# Patient Record
Sex: MALE | Race: WHITE | Employment: UNEMPLOYED | URBAN - METROPOLITAN AREA
[De-identification: names, ages, dates, MRNs, and addresses within clinical notes are randomized per-mention and may not be internally consistent; named-entity substitution may affect disease eponyms.]

---

## 2021-04-11 ENCOUNTER — HOSPITAL ENCOUNTER (INPATIENT)
Age: 21
LOS: 3 days | Discharge: HOME | DRG: 885 | End: 2021-04-14
Attending: EMERGENCY MEDICINE | Admitting: PSYCHIATRY & NEUROLOGY
Payer: COMMERCIAL

## 2021-04-11 DIAGNOSIS — R45.851 SUICIDAL IDEATION: Primary | ICD-10-CM

## 2021-04-11 PROBLEM — F33.2 MDD (MAJOR DEPRESSIVE DISORDER), RECURRENT SEVERE, WITHOUT PSYCHOSIS (HCC): Status: ACTIVE | Noted: 2021-04-11

## 2021-04-11 PROBLEM — F32.9 MAJOR DEPRESSIVE DISORDER, SINGLE EPISODE, UNSPECIFIED: Status: ACTIVE | Noted: 2021-04-11

## 2021-04-11 PROBLEM — R03.0 ELEVATED BLOOD PRESSURE READING IN OFFICE WITHOUT DIAGNOSIS OF HYPERTENSION: Status: ACTIVE | Noted: 2021-04-11

## 2021-04-11 PROBLEM — F32.2 CURRENT SEVERE EPISODE OF MAJOR DEPRESSIVE DISORDER WITHOUT PSYCHOTIC FEATURES WITHOUT PRIOR EPISODE (HCC): Status: ACTIVE | Noted: 2021-04-11

## 2021-04-11 LAB
ABSOLUTE EOS #: 0.1 K/UL (ref 0–0.4)
ABSOLUTE IMMATURE GRANULOCYTE: ABNORMAL K/UL (ref 0–0.3)
ABSOLUTE LYMPH #: 2.7 K/UL (ref 1.2–5.2)
ABSOLUTE MONO #: 0.7 K/UL (ref 0.1–1.3)
ACETAMINOPHEN LEVEL: <5 UG/ML (ref 10–30)
ALBUMIN SERPL-MCNC: 4.6 G/DL (ref 3.5–5.2)
ALBUMIN/GLOBULIN RATIO: ABNORMAL (ref 1–2.5)
ALP BLD-CCNC: 102 U/L (ref 40–129)
ALT SERPL-CCNC: 42 U/L (ref 5–41)
ANION GAP SERPL CALCULATED.3IONS-SCNC: 14 MMOL/L (ref 9–17)
AST SERPL-CCNC: 31 U/L
BASOPHILS # BLD: 1 % (ref 0–2)
BASOPHILS ABSOLUTE: 0.1 K/UL (ref 0–0.2)
BILIRUB SERPL-MCNC: 0.6 MG/DL (ref 0.3–1.2)
BUN BLDV-MCNC: 15 MG/DL (ref 6–20)
BUN/CREAT BLD: ABNORMAL (ref 9–20)
CALCIUM SERPL-MCNC: 9.5 MG/DL (ref 8.6–10.4)
CHLORIDE BLD-SCNC: 103 MMOL/L (ref 98–107)
CO2: 23 MMOL/L (ref 20–31)
CREAT SERPL-MCNC: 0.77 MG/DL (ref 0.7–1.2)
DIFFERENTIAL TYPE: ABNORMAL
EOSINOPHILS RELATIVE PERCENT: 1 % (ref 0–4)
ETHANOL PERCENT: <0.01 %
ETHANOL: <10 MG/DL
GFR AFRICAN AMERICAN: ABNORMAL ML/MIN
GFR NON-AFRICAN AMERICAN: ABNORMAL ML/MIN
GFR SERPL CREATININE-BSD FRML MDRD: ABNORMAL ML/MIN/{1.73_M2}
GFR SERPL CREATININE-BSD FRML MDRD: ABNORMAL ML/MIN/{1.73_M2}
GLUCOSE BLD-MCNC: 117 MG/DL (ref 70–99)
HCT VFR BLD CALC: 43.1 % (ref 41–53)
HEMOGLOBIN: 15 G/DL (ref 13.5–17.5)
IMMATURE GRANULOCYTES: ABNORMAL %
LYMPHOCYTES # BLD: 22 % (ref 25–45)
MCH RBC QN AUTO: 30.3 PG (ref 26–34)
MCHC RBC AUTO-ENTMCNC: 34.7 G/DL (ref 31–37)
MCV RBC AUTO: 87.3 FL (ref 80–100)
MONOCYTES # BLD: 6 % (ref 2–8)
NRBC AUTOMATED: ABNORMAL PER 100 WBC
PDW BLD-RTO: 12.6 % (ref 11.5–14.9)
PLATELET # BLD: 328 K/UL (ref 150–450)
PLATELET ESTIMATE: ABNORMAL
PMV BLD AUTO: 9.2 FL (ref 6–12)
POTASSIUM SERPL-SCNC: 4.3 MMOL/L (ref 3.7–5.3)
RBC # BLD: 4.94 M/UL (ref 4.5–5.9)
RBC # BLD: ABNORMAL 10*6/UL
SALICYLATE LEVEL: <1 MG/DL (ref 3–10)
SARS-COV-2, RAPID: NOT DETECTED
SEG NEUTROPHILS: 70 % (ref 34–64)
SEGMENTED NEUTROPHILS ABSOLUTE COUNT: 8.7 K/UL (ref 1.3–9.1)
SODIUM BLD-SCNC: 140 MMOL/L (ref 135–144)
SPECIMEN DESCRIPTION: NORMAL
TOTAL PROTEIN: 8.9 G/DL (ref 6.4–8.3)
WBC # BLD: 12.3 K/UL (ref 4.5–13.5)
WBC # BLD: ABNORMAL 10*3/UL

## 2021-04-11 PROCEDURE — 6370000000 HC RX 637 (ALT 250 FOR IP): Performed by: PSYCHIATRY & NEUROLOGY

## 2021-04-11 PROCEDURE — 87635 SARS-COV-2 COVID-19 AMP PRB: CPT

## 2021-04-11 PROCEDURE — 99283 EMERGENCY DEPT VISIT LOW MDM: CPT

## 2021-04-11 PROCEDURE — 36415 COLL VENOUS BLD VENIPUNCTURE: CPT

## 2021-04-11 PROCEDURE — 80053 COMPREHEN METABOLIC PANEL: CPT

## 2021-04-11 PROCEDURE — 80179 DRUG ASSAY SALICYLATE: CPT

## 2021-04-11 PROCEDURE — 99222 1ST HOSP IP/OBS MODERATE 55: CPT | Performed by: INTERNAL MEDICINE

## 2021-04-11 PROCEDURE — 80143 DRUG ASSAY ACETAMINOPHEN: CPT

## 2021-04-11 PROCEDURE — 85025 COMPLETE CBC W/AUTO DIFF WBC: CPT

## 2021-04-11 PROCEDURE — 1240000000 HC EMOTIONAL WELLNESS R&B

## 2021-04-11 PROCEDURE — 80307 DRUG TEST PRSMV CHEM ANLYZR: CPT

## 2021-04-11 PROCEDURE — 90792 PSYCH DIAG EVAL W/MED SRVCS: CPT | Performed by: PSYCHIATRY & NEUROLOGY

## 2021-04-11 PROCEDURE — G0480 DRUG TEST DEF 1-7 CLASSES: HCPCS

## 2021-04-11 RX ORDER — PAROXETINE HYDROCHLORIDE 25 MG/1
25 TABLET, FILM COATED, EXTENDED RELEASE ORAL EVERY MORNING
Status: ON HOLD | COMMUNITY
End: 2021-04-14 | Stop reason: HOSPADM

## 2021-04-11 RX ORDER — POLYETHYLENE GLYCOL 3350 17 G/17G
17 POWDER, FOR SOLUTION ORAL DAILY PRN
Status: DISCONTINUED | OUTPATIENT
Start: 2021-04-11 | End: 2021-04-14 | Stop reason: HOSPADM

## 2021-04-11 RX ORDER — LORAZEPAM 1 MG/1
1 TABLET ORAL NIGHTLY
Status: DISCONTINUED | OUTPATIENT
Start: 2021-04-11 | End: 2021-04-11

## 2021-04-11 RX ORDER — CLORAZEPATE DIPOTASSIUM 7.5 MG/1
7.5 TABLET ORAL NIGHTLY
COMMUNITY

## 2021-04-11 RX ORDER — ACETAMINOPHEN 325 MG/1
650 TABLET ORAL EVERY 4 HOURS PRN
Status: DISCONTINUED | OUTPATIENT
Start: 2021-04-11 | End: 2021-04-14 | Stop reason: HOSPADM

## 2021-04-11 RX ORDER — MAGNESIUM HYDROXIDE/ALUMINUM HYDROXICE/SIMETHICONE 120; 1200; 1200 MG/30ML; MG/30ML; MG/30ML
30 SUSPENSION ORAL EVERY 6 HOURS PRN
Status: DISCONTINUED | OUTPATIENT
Start: 2021-04-11 | End: 2021-04-14 | Stop reason: HOSPADM

## 2021-04-11 RX ORDER — TRAZODONE HYDROCHLORIDE 50 MG/1
50 TABLET ORAL NIGHTLY PRN
Status: DISCONTINUED | OUTPATIENT
Start: 2021-04-11 | End: 2021-04-14 | Stop reason: HOSPADM

## 2021-04-11 RX ORDER — HYDROXYZINE 50 MG/1
50 TABLET, FILM COATED ORAL 3 TIMES DAILY PRN
Status: DISCONTINUED | OUTPATIENT
Start: 2021-04-11 | End: 2021-04-14 | Stop reason: HOSPADM

## 2021-04-11 RX ORDER — PAROXETINE HYDROCHLORIDE 12.5 MG/1
25 TABLET, FILM COATED, EXTENDED RELEASE ORAL EVERY MORNING
Status: DISCONTINUED | OUTPATIENT
Start: 2021-04-12 | End: 2021-04-11

## 2021-04-11 RX ORDER — PAROXETINE HYDROCHLORIDE 12.5 MG/1
25 TABLET, FILM COATED, EXTENDED RELEASE ORAL EVERY MORNING
Status: DISCONTINUED | OUTPATIENT
Start: 2021-04-11 | End: 2021-04-14 | Stop reason: HOSPADM

## 2021-04-11 RX ORDER — CLONAZEPAM 0.5 MG/1
0.5 TABLET ORAL NIGHTLY
Status: DISCONTINUED | OUTPATIENT
Start: 2021-04-11 | End: 2021-04-14 | Stop reason: HOSPADM

## 2021-04-11 RX ORDER — IBUPROFEN 400 MG/1
400 TABLET ORAL EVERY 6 HOURS PRN
Status: DISCONTINUED | OUTPATIENT
Start: 2021-04-11 | End: 2021-04-14 | Stop reason: HOSPADM

## 2021-04-11 RX ORDER — CLORAZEPATE DIPOTASSIUM 7.5 MG/1
7.5 TABLET ORAL NIGHTLY
Status: DISCONTINUED | OUTPATIENT
Start: 2021-04-11 | End: 2021-04-11 | Stop reason: CLARIF

## 2021-04-11 RX ADMIN — HYDROXYZINE HYDROCHLORIDE 50 MG: 50 TABLET, FILM COATED ORAL at 18:56

## 2021-04-11 RX ADMIN — CLONAZEPAM 0.5 MG: 0.5 TABLET ORAL at 20:31

## 2021-04-11 RX ADMIN — PAROXETINE HYDROCHLORIDE 25 MG: 12.5 TABLET, FILM COATED, EXTENDED RELEASE ORAL at 15:33

## 2021-04-11 RX ADMIN — TRAZODONE HYDROCHLORIDE 50 MG: 50 TABLET ORAL at 20:31

## 2021-04-11 SDOH — HEALTH STABILITY: MENTAL HEALTH: HOW OFTEN DO YOU HAVE A DRINK CONTAINING ALCOHOL?: NEVER

## 2021-04-11 ASSESSMENT — SLEEP AND FATIGUE QUESTIONNAIRES
DO YOU HAVE DIFFICULTY SLEEPING: YES
RESTFUL SLEEP: NO
DO YOU USE A SLEEP AID: NO
DIFFICULTY STAYING ASLEEP: NO
AVERAGE NUMBER OF SLEEP HOURS: 8

## 2021-04-11 ASSESSMENT — PATIENT HEALTH QUESTIONNAIRE - PHQ9: SUM OF ALL RESPONSES TO PHQ QUESTIONS 1-9: 3

## 2021-04-11 ASSESSMENT — ENCOUNTER SYMPTOMS
SHORTNESS OF BREATH: 0
VOMITING: 0
ABDOMINAL PAIN: 0
BACK PAIN: 0
COUGH: 0
DIARRHEA: 0

## 2021-04-11 ASSESSMENT — LIFESTYLE VARIABLES
HISTORY_ALCOHOL_USE: NO
HISTORY_ALCOHOL_USE: NO

## 2021-04-11 NOTE — H&P
Department of Psychiatry  Attending Physician Psychiatric Assessment     Reason for Admission to Psychiatric Unit:  Concerns about patient's safety in the community    CHIEF COMPLAINT:  Suicidal ideation to drive car off bridge    History obtained from: Patient, electronic medical record     PER ED REPORT:  \"This is a 29-year-old male with a history of depression who comes in today with suicidal ideation the patient was brought here by his friends the patient has a plan to take his car and drive it off of the bridge. The patient states that he is taking medications for his depression he follows up with a psychiatrist his grandparents  earlier this year and those were the family members that he was closest with he has been having some arguments with his parents and he has been living at different friends houses because he has an unstable living situation. He says sometimes he drinks alcohol but he did not drink alcohol today. He denies any drug use. He denies any medical complaints at this time\". HISTORY OF PRESENT ILLNESS:    Orestes Vasquez is a 24 y.o. male with significant past medical history of elevated blood pressure reading in office without diagnosis of hypertension who was brought to the ED by friends who became concerned for patient because he wasn't acting like himself. Patient presented with suicidal ideation to take his car and drive it off the bridge. Patient started becoming depressed due to his grandfather's death in March and his grandmother's death in the fall. Patient identifies them as his primary support system. Patient states that his father just kicked him out \"he doesn't get the mental health stuff\" so he is staying with friends. Patient reports feeling down and low for more days than not. Endorses anhedonia. Patient notes worsening depression has been worsening for last approximately 3 to 4 weeks . Patient identifies stressors as the loss of his grandparents.  Patient reports drinking 3 to 4 drinks almost daily, especially around his dad. Patient reports he drinks seltzers. Denies the use of any other drugs or substances. LEGAL HISTORY:   HISTORY OF INCARCERATION: no    Family History:   History reviewed. No pertinent family history. Psychiatric Family History  Reports he believes his aunt has depression. Denies suicides in family  Endorses substance use in family; alcohol use by father and reports alcohol use \"by most of the family\"    Nutrition/Sleep/Physical Activity  Appetite: Diminished appetite since August. Patient reports a weight loss of 10-15 pounds since August.     Sleep: Patient reports he either sleeps \"a lot\" or gets up in the middle of the night. Patient reports his sleep is non-restorative. Patient endorses nightmares frequently regarding past trauma. Physical Activity: Walking    PHYSICAL EXAM:  Vitals:  BP (!) 126/106   Pulse 88   Temp 98.8 °F (37.1 °C) (Oral)   Resp 16   Ht 5' 10\" (1.778 m)   Wt 245 lb (111.1 kg)   SpO2 98%   BMI 35.15 kg/m²     LABS:  Labs reviewed. Review of Systems   Constitutional: Negative for chills and weight loss. HENT: Negative for ear pain and nosebleeds. Eyes: Negative for blurred vision and photophobia. Respiratory: Negative for cough, shortness of breath and wheezing. Cardiovascular: Negative for chest pain and palpitations. Gastrointestinal: Negative for abdominal pain, diarrhea and vomiting. Genitourinary: Negative for dysuria and urgency. Musculoskeletal: Negative for falls and joint pain. Patient reports his left ankle pain is resolved. Skin: Negative for itching and rash. Neurological: Negative for tremors, seizures and weakness. Endo/Heme/Allergies: Does not bruise/bleed easily. Physical Exam:   Constitutional:  Appears well-developed and well-nourished, no acute distress. HENT:   Head: Normocephalic and atraumatic. Eyes: Conjunctivae are normal. Right eye exhibits no discharge. after symptoms stabilized. Risk Management:  close watch per standard protocol      Psychotherapy:  participation in milieu and group and individual sessions with Attending Physician,  and Physician Assistant/CNP      Estimated length of stay:  2-14 days      GENERAL PATIENT/FAMILY EDUCATION  Patient will understand basic signs and symptoms, patient will understand benefits/risks and potential side effects from proposed medications, and patient will understand their role in recovery. Family is not active in patient's care. Patient assets that may be helpful during treatment include: Intent to participate and engage in treatment, sufficient fund of knowledge and intellect to understand and utilize treatments. Goals:    1) Remission of suicidal ideation  2) Stabilization of symptoms prior to discharge  3) Establish efficacy and tolerability of medications. Behavioral Services  Medicare Certification     Admission Day 1  I certify that this patient's inpatient psychiatric hospital admission is medically necessary for:    x (1) treatment which could reasonably be expected to improve this patient's condition, or    x (2) diagnostic study or its equivalent. Time Spent: 60 minutes     Physicians Signature:  Electronically signed by KAMRYN Cervantes CNP on 4/11/21 at 2:31 PM EDT                                         Psychiatry Attending Attestation     I independently saw and evaluated the patient. I reviewed the nurse practitioner's documentation above. Any additional comments or changes to the nurse practitioners documentation are stated below otherwise agree with assessment. Patient is a 27-year-old single  male with history of depression admitted for worsening suicidal thoughts and a plan to consult. Patient reports he has been feeling down sad and low since February of this year.   Reports his paternal grandparents who were very supportive passed away in February following which she has been dealing with severe depression. Reports he got into a conflict with his father following which his father kicked him out of the house. Reports he has been living with his friends for last few weeks which has been very stressful for him. Mentions that he has a job at this point and his friends are very supportive. Endorses constant feelings of helplessness hopelessness and worthlessness. Reports poor energy and concentration. Reports having trouble falling asleep and staying asleep. Reports poor appetite. Agree with rest of the assessment and plan as above.      Electronically signed by Siva Whalen MD on 4/11/21 at 6:24 PM EDT

## 2021-04-11 NOTE — GROUP NOTE
Group Therapy Note    Date: 4/11/2021    Group Start Time: 0900  Group End Time: 0915  Group Topic: Community Meeting    TORREY Trevizo        Group Therapy Note    Attendees: 11/21         Patient's Goal:  To increase interpersonal interaction     Notes:      Status After Intervention:  Improved    Participation Level:  Active Listener and Interactive    Participation Quality: Appropriate, Attentive and Sharing      Speech:  normal      Thought Process/Content: Logical  Linear      Affective Functioning: Congruent      Mood: euthymic      Level of consciousness:  Alert, Oriented x4 and Attentive      Response to Learning: Progressing to goal      Endings: None Reported    Modes of Intervention: Education, Support, Socialization, Exploration, Clarifying and Problem-solving      Discipline Responsible: Psychoeducational Specialist      Signature:  Liliana Trevizo

## 2021-04-11 NOTE — CONSULTS
[facility] Internal Medicine    CONSULTATION    / FOLLOW UP VISIT       Date:   4/11/2021  Patient name:  Vida Maier  Date of admission:  4/11/2021  2:52 AM  MRN:   [id number]  Account:  [de-identified]  YOB: 2000  PCP:    No primary care provider on file. Room:   [address] [place] [address]  Code Status:    Full Code    Physician Requesting Consult: Jalen Page MD    History of Present Illness:      C/C ;  Medical comorbidity management     REASON FOR CONSULT;  Medical comorbidity and medication management ;                                                 *Active Problems:    Major depressive disorder, single episode, unspecified    Elevated blood pressure reading in office without diagnosis of hypertension  Resolved Problems:    * No resolved hospital problems. *           HPI;        Consulted for high BP . Was 164 on admission , 126 now . Anxious , depressed . Will monitor . Past and Surgical hx as in H and P  Social History:     Tobacco:    reports that he has never smoked. He has never used smokeless tobacco.  Alcohol:      reports no history of alcohol use. Drug Use:  reports no history of drug use. Review of Systems:     POSITIVE AND NEGATIVES AS DESCRIBED IN HISTORY OF PRESENT ILLNESS ;  IN ADDITION ;  Review of Systems          All other systems negative                Physical Exam:     Physical Exam   Vitals:    04/11/21 0330 04/11/21 0556 04/11/21 0830   BP: (!) 151/74 (!) 147/79 (!) 126/106   Pulse: 100 100 88   Resp: 18 16 16   Temp: 99.2 °F (37.3 °C) 99.2 °F (37.3 °C) 98.8 °F (37.1 °C)   TempSrc: Oral Oral Oral   SpO2: 98% 98%    Weight: 245 lb (111.1 kg) 245 lb (111.1 kg)    Height: 5' 10\" (1.778 m) 5' 10\" (1.778 m)                    Body mass index is 35.15 kg/m². General Appearance:   -, CO-OPERATIVE ,                                                        Pulmonary/Chest:        Clear to auscultation bilaterally . No wheezes, rales or rhonchi . Cardiovascular:            Normal rate, regular rhythm,                                          No murmur or  Gallop . Abdomen:                       Soft, non-tender                                                                                    Extremities:                    No Edema . Neuromuskuloskeletal    ... Data:     URINE ANALYSIS: No results found for: LABURIN     CBC:  Lab Results   Component Value Date    WBC 12.3 04/11/2021    HGB 15.0 04/11/2021     04/11/2021        BMP:    Lab Results   Component Value Date     04/11/2021    K 4.3 04/11/2021     04/11/2021    CO2 23 04/11/2021    BUN 15 04/11/2021    CREATININE 0.77 04/11/2021    GLUCOSE 117 04/11/2021      LIVER PROFILE:  Lab Results   Component Value Date    ALT 42 04/11/2021    AST 31 04/11/2021    PROT 8.9 04/11/2021    BILITOT 0.60 04/11/2021    LABALBU 4.6 04/11/2021             Radiology:         Medications: Allergies:  No Known Allergies    Current Meds:   Scheduled Meds:   Continuous Infusions:   PRN Meds: acetaminophen, aluminum & magnesium hydroxide-simethicone, hydrOXYzine, ibuprofen, polyethylene glycol, traZODone        Assessment :       Assessment Dx  Active Problems:    Major depressive disorder, single episode, unspecified    Elevated blood pressure reading in office without diagnosis of hypertension  Resolved Problems:    * No resolved hospital problems. *              Plan: Will monitor . Most likely strss induced . Labs ok      Thanks for consulting us . Will monitor vitals and clinical course , and  Optimize therapy  as needed . Dorcas Calvert MD    Copy sent to Dr. Janie Patrick primary care provider on file. Pleasenote that this chart was generated using voice recognition Dragon dictation software.   Although every effort was made to ensure the accuracy of this automated transcription, some errors in transcription may have occurred.

## 2021-04-11 NOTE — ED PROVIDER NOTES
EMERGENCY DEPARTMENT ENCOUNTER    Pt Name: Clotilde Doran  MRN: [id number]  [place] 2000  Date of evaluation: 21  CHIEF COMPLAINT       Chief Complaint   Patient presents with    Mental Health Problem     HISTORY OF PRESENT ILLNESS   HPI     This is a 64-year-old male with a history of depression who comes in today with suicidal ideation the patient was brought here by his friends the patient has a plan to take his car and drive it off of the bridge. The patient states that he is taking medications for his depression he follows up with a psychiatrist his grandparents  earlier this year and those were the family members that he was closest with he has been having some arguments with his parents and he has been living at different friends houses because he has an unstable living situation. He says sometimes he drinks alcohol but he did not drink alcohol today. He denies any drug use. He denies any medical complaints at this time    REVIEW OF SYSTEMS     Review of Systems   Constitutional: Negative for fever. HENT: Negative for congestion. Respiratory: Negative for cough and shortness of breath. Cardiovascular: Negative for chest pain. Gastrointestinal: Negative for abdominal pain, diarrhea and vomiting. Genitourinary: Negative for dysuria. Musculoskeletal: Negative for back pain. Skin: Negative for rash. Neurological: Negative for headaches. Psychiatric/Behavioral: Positive for suicidal ideas. All other systems reviewed and are negative. PASTMEDICAL HISTORY   History reviewed. No pertinent past medical history. SURGICAL HISTORY     History reviewed. No pertinent surgical history. CURRENT MEDICATIONS       Previous Medications    No medications on file     ALLERGIES     has No Known Allergies. FAMILY HISTORY     has no family status information on file.       SOCIAL HISTORY       Social History     Tobacco Use    Smoking status: Never Smoker    Smokeless tobacco: Never Used   Substance Use Topics    Alcohol use: Never     Frequency: Never    Drug use: Never     PHYSICAL EXAM     INITIAL VITALS: BP (!) 151/74   Pulse 100   Temp 99.2 °F (37.3 °C) (Oral)   Resp 18   Ht 5' 10\" (1.778 m)   Wt 245 lb (111.1 kg)   SpO2 98%   BMI 35.15 kg/m²    Physical Exam  Vitals signs and nursing note reviewed. Constitutional:       General: He is not in acute distress. Appearance: He is well-developed. He is obese. HENT:      Head: Normocephalic and atraumatic. Eyes:      Conjunctiva/sclera: Conjunctivae normal.   Neck:      Musculoskeletal: Neck supple. Cardiovascular:      Rate and Rhythm: Normal rate and regular rhythm. Heart sounds: No murmur. No friction rub. Pulmonary:      Effort: Pulmonary effort is normal. No respiratory distress. Breath sounds: Normal breath sounds. No stridor. No wheezing or rhonchi. Abdominal:      General: There is no distension. Palpations: Abdomen is soft. Tenderness: There is no abdominal tenderness. There is no guarding or rebound. Skin:     General: Skin is warm and dry. Capillary Refill: Capillary refill takes less than 2 seconds. Neurological:      Mental Status: He is alert. Psychiatric:      Comments: Suicidal ideation       DIAGNOSTIC RESULTS   RADIOLOGY:All plain film, CT, MRI, and formal ultrasound images (except ED bedside ultrasound) are read by the radiologist, see reports below, unless otherwisenoted in MDM or here. No orders to display     LABS: All lab results were reviewed by myself, and all abnormals are listed below.   Labs Reviewed   CBC WITH AUTO DIFFERENTIAL - Abnormal; Notable for the following components:       Result Value    Seg Neutrophils 70 (*)     Lymphocytes 22 (*)     All other components within normal limits   COMPREHENSIVE METABOLIC PANEL - Abnormal; Notable for the following components:    Glucose 117 (*)     ALT 42 (*)     Total Protein 8.9 (*)     All other components within normal limits   ACETAMINOPHEN LEVEL - Abnormal; Notable for the following components:    Acetaminophen Level <5 (*)     All other components within normal limits   SALICYLATE LEVEL - Abnormal; Notable for the following components:    Salicylate Lvl <1 (*)     All other components within normal limits   COVID-19, RAPID   ETHANOL   URINE DRUG SCREEN       EMERGENCY DEPARTMENTCOURSE:   Differential diagnosis includes exacerbation of chronic mental illness medication noncompliance polysubstance abuse. 4:50 AM EDT  Labs are unremarkable he has no medical complaints at this time he is medically cleared he will be admitted for further management of his mental health disorder    Vitals:    Vitals:    04/11/21 0330   BP: (!) 151/74   Pulse: 100   Resp: 18   Temp: 99.2 °F (37.3 °C)   TempSrc: Oral   SpO2: 98%   Weight: 245 lb (111.1 kg)   Height: 5' 10\" (1.778 m)       FINAL IMPRESSION      1.  Suicidal ideation         DISPOSITION/PLAN   DISPOSITION Decision To Admit 04/11/2021 04:02:37 AM  Rosenda Weston MD  Attending Emergency Physician    This charting supersedes any ED resident or staff charting and was written using speech recognition software       Rosenda Weston MD  04/11/21 7969

## 2021-04-11 NOTE — GROUP NOTE
Group Therapy Note    Date: 4/11/2021    Group Start Time: 1100  Group End Time: 4462  Group Topic: Recreational    TORREY BHI PRICE Merino LPN                   Status After Intervention:  Improved    Participation Level:  Active Listener    Participation Quality: Sharing      Speech:  normal      Thought Process/Content: Logical      Affective Functioning: Congruent      Mood: anxious      Level of consciousness:  Oriented x4      Response to Learning: Able to verbalize/acknowledge new learning      Endings: None Reported    Modes of Intervention: Socialization      Discipline Responsible: Licensed Practical Nurse      Signature:  nAil Abarca LPN

## 2021-04-11 NOTE — PLAN OF CARE
[facility]  Initial Interdisciplinary Treatment Plan NO      Original treatment plan Date & Time: 4/11/2021 0839    Admission Type:  Admission Type: Voluntary    Reason for admission:        Estimated Length of Stay:  5-7days  Estimated Discharge Date: to be determined by physician    PATIENT STRENGTHS:  Patient Strengths:Strengths: Communication, Positive Support, Social Skills, Medication Compliance, Motivated  Patient Strengths and Limitations:Limitations: Tendency to isolate self  Addictive Behavior: Addictive Behavior  In the past 3 months, have you felt or has someone told you that you have a problem with:  : None  Do you have a history of Chemical Use?: No  Do you have a history of Alcohol Use?: No  Do you have a history of Street Drug Abuse?: No  Histroy of Prescripton Drug Abuse?: No  Medical Problems:History reviewed. No pertinent past medical history.   Status EXAM:Status and Exam  Normal: No  Facial Expression: Sad, Flat  Affect: Blunt  Level of Consciousness: Alert  Mood:Normal: No  Mood: Depressed, Sad  Motor Activity:Normal: No  Motor Activity: Decreased  Interview Behavior: Cooperative  Preception: [place] to Person, Nadine Love to Time, [place] to Place, [place] to Situation  Attention:Normal: No  Attention: Distractible  Thought Processes: Circumstantial  Thought Content:Normal: No  Thought Content: Preoccupations  Hallucinations: None  Delusions: No  Memory:Normal: Yes  Insight and Judgment: No  Insight and Judgment: Poor Judgment, Poor Insight  Present Suicidal Ideation: No(contracts for safety)  Present Homicidal Ideation: No    EDUCATION:   Learner Progress Toward Treatment Goals: reviewed group plans and strategies for care    Method:group therapy, medication compliance, individualized assessments and care planning    Outcome: needs reinforcement    PATIENT GOALS: to be discussed with patient within 72 hours    PLAN/TREATMENT RECOMMENDATIONS:     continue group therapy , medications compliance, goal setting, individualized assessments and care, continue to monitor pt on unit      SHORT-TERM GOALS:   Time frame for Short-Term Goals: 5-7 days    LONG-TERM GOALS:  Time frame for Long-Term Goals: 6 months  Members Present in Team Meeting: See Signature Sheet    Travis Lopez

## 2021-04-11 NOTE — CARE COORDINATION
BHI Biopsychosocial Assessment    Current Level of Psychosocial Functioning     Independent   Dependent    Minimal Assist X     Psychosocial High Risk Factors (check all that apply)    Unable to obtain meds   Chronic illness/pain    Substance abuse   Lack of Family Support X  Financial stress X  Isolation X  Inadequate Community Resources  Suicide attempt(s)  Not taking medications X  Victim of crime   Developmental Delay  Unable to manage personal needs  X  Age 72 or older   Homeless X  No transportation   Readmission within 30 days  Unemployment  Traumatic Event    Psychiatric Advanced Directives: none reported     Family to Involve in Treatment: lack of family support, PT denies wanting to have his family involved in his treatment     Sexual Orientation:  SRIDHAR    Patient Strengths: follows up with physician for medications and reports medication management, denies any Alcohol or illcit drug use. Patient Barriers: presenting on admission with suicidal ideation    Opiate Education Provided: N/A Pt denies and does not have a documented history of Opiate or Heroin use. abuse. [facility]HC/mental health history: Pt reports that he follows up with Dr. Minna Barrera, [address] [place] [address] [address], [place] [address] [phone], PT reports that he has been compliant with the medication that his physician has prescribed. Plan of Care   medication management, group/individual therapies, family meetings, psycho -education, treatment team meetings to assist with stabilization, referral to community resources. Initial Discharge Plan:  Pt reports a plan to return to live with his friend in [place] at discharge, he reports a plan to continue to follow up for medication management with his [address] [place] [address] Rd. Clinical Summary:     Delbert Castellanos is a 24 y.o. male who  presented with suicidal ideation to take his car and drive it off the bridge.  PT reports that his friend's were concerned abut him and they brought him to the hospital. Patient reports that he started becoming depressed due to his grandfather's death in March and his grandmother's death in the fall. Patient identifies them as his primary support system. Patient states that his father just kicked him out \"he doesn't get the mental health stuff\" so he is staying with friends.    Pt reports a recent increase in symptoms of Depression and endorses feelings of helplessness, hopelessness, and worthlessness. Patient reports feeling down and low for more days than not. Patient notes worsening depression has been worsening for last approximately 3 to 4 weeks . Patient identifies stressors as the loss of his grandparents. Patient reports poor sleep and appetite. Patient reports having trouble falling asleep and staying asleep. Patient reports poor concentration and energy. Patient denies homicidal ideation, plans, or intent. Patient denies auditory hallucinations, visual hallucinations, or paranoia. Pt reports that he follows up with Dr. Minna Barrera, [facility] [place] [address], [place] [id number] [phone], PT reports that he has been compliant with the medication that his physician has prescribed. Pt denies any alcohol or illicit drug use.

## 2021-04-11 NOTE — PROGRESS NOTES
Behavioral Services  Medicare Certification Upon Admission    I certify that this patient's inpatient psychiatric hospital admission is medically necessary for:    [x] (1) Treatment which could reasonably be expected to improve this patient's condition,       [x] (2) Or for diagnostic study;     AND     [x](2) The inpatient psychiatric services are provided while the individual is under the care of a physician and are included in the individualized plan of care.     Estimated length of stay/service 3-5 days    Plan for post-hospital care [facility]    Electronically signed by Felix Oconnell MD on 4/11/2021 at 6:23 PM

## 2021-04-11 NOTE — PLAN OF CARE
Problem: Altered Mood, Depressive Behavior:  Goal: Able to verbalize and/or display a decrease in depressive symptoms  Description: Able to verbalize and/or display a decrease in depressive symptoms  Outcome: Ongoing   Mr Kortney Price is seen in his room, affect is flat but he is brightened upon approach. Reports increased in depression due to strained relationship between him and his father, was recently kicked out of fathers home and stays between different friends. Fleeting suicidal thoughts but agrees to seek out staff,Denies any issues with sleep or appetite. No audio or visual hallucinations. Social with peers and does attend groups.  15 minute safety checks continue
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
## 2021-04-12 PROCEDURE — 99232 SBSQ HOSP IP/OBS MODERATE 35: CPT | Performed by: PSYCHIATRY & NEUROLOGY

## 2021-04-12 PROCEDURE — 6370000000 HC RX 637 (ALT 250 FOR IP): Performed by: PSYCHIATRY & NEUROLOGY

## 2021-04-12 PROCEDURE — 99231 SBSQ HOSP IP/OBS SF/LOW 25: CPT | Performed by: INTERNAL MEDICINE

## 2021-04-12 PROCEDURE — 1240000000 HC EMOTIONAL WELLNESS R&B

## 2021-04-12 RX ADMIN — TRAZODONE HYDROCHLORIDE 50 MG: 50 TABLET ORAL at 22:04

## 2021-04-12 RX ADMIN — HYDROXYZINE HYDROCHLORIDE 50 MG: 50 TABLET, FILM COATED ORAL at 22:04

## 2021-04-12 RX ADMIN — CLONAZEPAM 0.5 MG: 0.5 TABLET ORAL at 22:04

## 2021-04-12 RX ADMIN — PAROXETINE HYDROCHLORIDE 25 MG: 12.5 TABLET, FILM COATED, EXTENDED RELEASE ORAL at 10:33

## 2021-04-12 NOTE — PROGRESS NOTES
(KLONOPIN) tablet 0.5 mg, 0.5 mg, Oral, Nightly, Joshua Phoenix, APRN - CNP, 0.5 mg at 04/11/21 2031      Examination:  BP (!) 148/89   Pulse 88   Temp 98.1 °F (36.7 °C) (Oral)   Resp 14   Ht 5' 10\" (1.778 m)   Wt 245 lb (111.1 kg)   SpO2 98%   BMI 35.15 kg/m²   Gait - steady  Medication side effects(SE): denies    Mental Status Examination:    Level of consciousness:  within normal limits   Appearance:  fair grooming and fair hygiene  Behavior/Motor:  no abnormalities noted  Attitude toward examiner:  cooperative, attentive and good eye contact  Speech:  spontaneous, normal rate and normal volume   Mood: depressed  Affect:  mood congruent  Thought processes:  linear, goal directed and coherent   Thought content:  Homicidal ideation - none  Suicidal Ideation:  passive  Delusions:  no evidence of delusions  Perceptual Disturbance:  denies any perceptual disturbance  Cognition:  oriented to person, place, and time   Concentration intact  Insight fair   Judgement fair     ASSESSMENT:   Patient symptoms are:  [] Well controlled  [x] Improving  [] Worsening  [] No change      Diagnosis:   Principal Problem:    MDD (major depressive disorder), recurrent severe, without psychosis ([facility] [address]  Active Problems:    Current severe episode of major depressive disorder without psychotic features without prior episode (HCC)    Elevated blood pressure reading in office without diagnosis of hypertension  Resolved Problems:    * No resolved hospital problems.  *      LABS:    Recent Labs     04/11/21  0413   WBC 12.3   HGB 15.0        Recent Labs     04/11/21  0413      K 4.3      CO2 23   BUN 15   CREATININE 0.77   GLUCOSE 117*     Recent Labs     04/11/21  0413   BILITOT 0.60   ALKPHOS 102   AST 31   ALT 42*     No results found for: Maretta Benjamín, LABBENZ, CANNAB, COCAINESCRN, LABMETH, OPIATESCREENURINE, PHENCYCLIDINESCREENURINE, PPXUR, ETOH  No results found for: TSH, FREET4  No results found for: LITHIUM No results found for: VALPROATE, CBMZ      Treatment Plan:  Reviewed current Medications with the patient. Risks, benefits, side effects, drug-to-drug interactions and alternatives to treatment were discussed. The patient consented to treatment. Encourage patient to attend group and other milieu activities. Discharge planning discussed with the patient and treatment team.    PSYCHOTHERAPY/COUNSELING:  [] Therapeutic interview  [x] Supportive  [] CBT  [] Ongoing  [] Other    [x] Patient continues to need, on a daily basis, active treatment furnished directly by or requiring the supervision of inpatient psychiatric personnel      Anticipated Length of stay: to be determined      Lyle Higgins is a 24 y.o. male being evaluated face to face. --Maykel Bey, APRN - CNP on 4/12/2021 at 3:30 PM    An electronic signature was used to authenticate this note. **This report has been created using voice recognition software. It may contain minor errors which are inherent in voice recognition technology. **  I independently saw and evaluated the patient. I reviewed the midlevel provider's documentation above. Any additional comments or changes to the midlevel provider's documentation are stated below otherwise agree with assessment.      -patient is from [facility]. -He is here for school and because he was kicked out by dad about two weeks ago. -Patient has taken the semester off.   -  -Brought here for SI which was noticed by his friends.   -Has been taking Paxil ER 25mg. Clorazepate was switched to Clonazepam in hospital.      -History of suicide attempt in .      -he has been diagnosed with an eating disorder. , he says. Says he needs more evaluation.      Plan- No changes to medications.        PLAN  Medications as noted above  Attempt to develop insight  Psycho-education conducted. Supportive Therapy conducted.   Probable discharge is as noted above  Follow-up daily while on inpatient unit Electronically signed by Molina Sanchez MD on 4/12/21 at 6:32 PM EDT

## 2021-04-12 NOTE — CONSULTS
[facility]    CONSULTATION    / FOLLOW UP VISIT       Date:   4/12/2021  Patient name:  Vida Maier  Date of admission:  4/11/2021  2:52 AM  MRN:   [id number]  Account:  [de-identified]  YOB: 2000  PCP:    No primary care provider on file. Room:   [address] [place] [address]  Code Status:    Full Code    Physician Requesting Consult: Jalen Page MD    History of Present Illness:      C/C ;  Medical comorbidity management     REASON FOR CONSULT;  Medical comorbidity and medication management ;                                                 *Principal Problem:    MDD (major depressive disorder), recurrent severe, without psychosis ([facility] [address]  Active Problems:    Current severe episode of major depressive disorder without psychotic features without prior episode ([facility] [address]    Elevated blood pressure reading in office without diagnosis of hypertension  Resolved Problems:    * No resolved hospital problems. *           HPI;        Consulted for high BP . Was 164 on admission , 126 now . Anxious , depressed . Will monitor . Past and Surgical hx as in H and P  Social History:     Tobacco:    reports that he has never smoked. He has never used smokeless tobacco.  Alcohol:      reports no history of alcohol use. Drug Use:  reports no history of drug use. Review of Systems:     POSITIVE AND NEGATIVES AS DESCRIBED IN HISTORY OF PRESENT ILLNESS ;  IN ADDITION ;  Review of Systems          All other systems negative                Physical Exam:     Physical Exam   Vitals:    04/11/21 0556 04/11/21 0830 04/11/21 1928 04/12/21 0821   BP: (!) 147/79 (!) 126/106 (!) 150/78 (!) 148/89   Pulse: 100 88 101 88   Resp: 16 16 14 14   Temp: 99.2 °F (37.3 °C) 98.8 °F (37.1 °C) 98.2 °F (36.8 °C) 98.1 °F (36.7 °C)   TempSrc: Oral Oral  Oral   SpO2: 98%   98%   Weight: 245 lb (111.1 kg)      Height: 5' 10\" (1.778 m)                      Body mass index is 35.15 kg/m².      General Appearance:   -, CO-OPERATIVE ,                                                        Pulmonary/Chest:        Clear to auscultation bilaterally . No wheezes, rales or rhonchi . Cardiovascular:            Normal rate, regular rhythm,                                          No murmur or  Gallop . Abdomen:                       Soft, non-tender                                                                                    Extremities:                    No Edema . Neuromuskuloskeletal    ... Data:     URINE ANALYSIS: No results found for: LABURIN     CBC:  Lab Results   Component Value Date    WBC 12.3 04/11/2021    HGB 15.0 04/11/2021     04/11/2021        BMP:    Lab Results   Component Value Date     04/11/2021    K 4.3 04/11/2021     04/11/2021    CO2 23 04/11/2021    BUN 15 04/11/2021    CREATININE 0.77 04/11/2021    GLUCOSE 117 04/11/2021      LIVER PROFILE:  Lab Results   Component Value Date    ALT 42 04/11/2021    AST 31 04/11/2021    PROT 8.9 04/11/2021    BILITOT 0.60 04/11/2021    LABALBU 4.6 04/11/2021             Radiology:         Medications: Allergies:  No Known Allergies    Current Meds:   Scheduled Meds:    PARoxetine  25 mg Oral QAM    clonazePAM  0.5 mg Oral Nightly     Continuous Infusions:   PRN Meds: acetaminophen, aluminum & magnesium hydroxide-simethicone, hydrOXYzine, ibuprofen, polyethylene glycol, traZODone        Assessment :       Assessment Dx  Principal Problem:    MDD (major depressive disorder), recurrent severe, without psychosis ([facility] [address]  Active Problems:    Current severe episode of major depressive disorder without psychotic features without prior episode (HCC)    Elevated blood pressure reading in office without diagnosis of hypertension  Resolved Problems:    * No resolved hospital problems. *              Plan: Will monitor .  Most likely strss induced .  Labs ok    4/12/21  BP improved . Will sign off . Thanks . Please call again , if neeeded . Thanks for consulting us . Will monitor vitals and clinical course , and  Optimize therapy  as needed . Deondre Gill MD    Copy sent to Dr. Jadon Sandoval primary care provider on file. Pleasenote that this chart was generated using voice recognition Dragon dictation software. Although every effort was made to ensure the accuracy of this automated transcription, some errors in transcription may have occurred.

## 2021-04-12 NOTE — GROUP NOTE
Group Therapy Note    Date: 4/12/2021    Group Start Time: 0900  Group End Time: 0930  Group Topic: Community Meeting    [facility], [address]        Group Therapy Note    Attendees: 13/22         Patient's Goal:  To increase social interaction and to explore and identify short term goals r/t wellness and recovery. RT discussed group schedule and unit structure/resources and encouraged pts to be engaged in their treatment. Pts were given opportunities to ask questions. Notes: Pt participated in group task and was unable to identify short term goals r/t wellness and recovery- RT discussed suggestions for goal with pt. Pt decided upon \"Talk with Doctor\" as a goal to find a starting point for treatment . Pt is pleasant on approach        Status After Intervention:  Improved     Participation Level:  Active Listener and Interactive     Participation Quality: Appropriate, Attentive, Sharing         Speech:   Normal     Thought Process/Content: Logical,linear     Affective Functioning: Blunted     Mood: dysthymic, pt is disheveled        Level of consciousness:  Alert, and Attentive        Response to Learning: Able to verbalize current knowledge/experience, Able to verbalize/acknowledge new learning, and Progressing to goal        Endings: None Reported     Modes of Intervention: Education, Support, Socialization, Exploration, Clarifying and Problem-solving        Discipline Responsible: Psychoeducational Specialist        Signature:  TREASURE Shirley

## 2021-04-12 NOTE — PLAN OF CARE
Problem: Altered Mood, Depressive Behavior:  Goal: Able to verbalize and/or display a decrease in depressive symptoms  Description: Able to verbalize and/or display a decrease in depressive symptoms  4/12/2021 0955 by Jamaal Fairbanks LPN  Outcome: Ongoing  Note: Patient is able to verbalize decrease in depression symptoms. Patient has been free of self harm. Q15 minute safety checks continue. Problem: Altered Mood, Depressive Behavior:  Goal: Ability to disclose and discuss suicidal ideas will improve  Description: Ability to disclose and discuss suicidal ideas will improve  4/12/2021 0955 by Jamaal Fairbanks LPN  Outcome: Ongoing  Note: Patient has been free of self harm and denies thoughts of harming self at this time. Patient has agreed to seek staff if he begins having thoughts of harming self or needs to talk. Q15 minute safety checks continue.

## 2021-04-12 NOTE — GROUP NOTE
Date: 4/12/2021    Group Start Time:   Group End Time:   Group Topic: cognitive skills   [facility]    TREASURE Urrutia        Group Therapy Note    Attendees 8/9         Patient's Goal:  To improve coping skills     Notes:   Pt was pleasant and participated well     Status After Intervention:  Improved    Participation Level:  Active Listener and Interactive    Participation Quality: Appropriate, Attentive, Sharing and Supportive      Speech:  normal      Thought Process/Content: Logical      Affective Functioning: flat      Mood: depressed       Level of consciousness:  Alert       Response to Learning: Able to verbalize current knowledge/experience and Progressing to goal      Endings: None Reported    Modes of Intervention: Education, Support and Problem-solving      Discipline Responsible: Psychoeducational Specialist      Signature:  Sheila Sánchez

## 2021-04-12 NOTE — GROUP NOTE
Group Therapy Note    Date: 4/12/2021    Group Start Time: 1430  Group End Time: 2910  Group Topic: Cognitive Skills    [facility]    TREASURE Casanova        Group Therapy Note    Attendees: 9/18         Patient's Goal:  To increase social interaction and explore strengths r/t 6 domains of wellness, as well as areas to work on in each domain and communication skills. Notes: Pt participated minimally in group . Pt had great difficulty exploring strengths r/t 6 domains of wellness, as well as areas to work on in each domain and communication skills. Pt explored very briefly, shared same with group, but did not engage in generating ideas with group and RT in discussion. Pt appeared preoccupied while peers were working on task. Status After Intervention: Unchanged         Participation Level:  Active Listener at times and Interactive briefly     Participation Quality:  Attentive at times, Sharing briefly         Speech:  minimal sharing, normal tone et apolonia        Thought Process/Content: Appears preoccupied at times, poverty of thought r/t topic           Affective Functioning: Blunted, brightens with support        Mood: dysthymic, mostly passive even given multiple prompts         Level of consciousness:  Alert, Oriented x4 and Attentive at times        Response to Learning: Able to verbalize current knowledge/experience-minimally,and Progressing to goal        Endings: None Reported     Modes of Intervention: Education, Support, Socialization, Exploration, Clarifying and Problem-solving        Discipline Responsible: Psychoeducational Specialist        Signature:  TREASURE Page

## 2021-04-12 NOTE — PLAN OF CARE
Problem: Altered Mood, Depressive Behavior:  Goal: Able to verbalize and/or display a decrease in depressive symptoms  Description: Able to verbalize and/or display a decrease in depressive symptoms  4/11/2021 2133 by Arturo Sotelo LPN  Outcome: Ongoing     Problem: Altered Mood, Depressive Behavior:  Goal: Ability to disclose and discuss suicidal ideas will improve  Description: Ability to disclose and discuss suicidal ideas will improve  Outcome: Ongoing   Patient verbalized having fleeting suicidal ideas at this time. Patient denies homicidal ideas at this time. Patient contracts for safety at this time. Patient verbalized depressive symptoms at this time. Patient has been out in day room watching tv and socializing with peers. Patient is free of self harm at this time. Patient agrees to seek out staff if thoughts to harm self arise. Staff will provide support and reassurance as needed. Safety checks maintained every 15 minutes.

## 2021-04-12 NOTE — PLAN OF CARE
[facility]  Day 3 Interdisciplinary Treatment Plan NOTE    Review Date & Time: 4/12/2021  1320    Admission Type:   Admission Type: Voluntary    Reason for admission:     Estimated Length of Stay: 5-7 days  Estimated Discharge Date Update: to be determined by physician    PATIENT STRENGTHS:  Patient Strengths Strengths: Communication, Connection to output provider  Patient Strengths and Limitations:Limitations: Difficult relationships / poor social skills, Difficulty problem solving/relies on others to help solve problems, Apathetic / unmotivated, Lacks leisure interests  Addictive Behavior:Addictive Behavior  In the past 3 months, have you felt or has someone told you that you have a problem with:  : None  Do you have a history of Chemical Use?: No  Do you have a history of Alcohol Use?: No  Do you have a history of Street Drug Abuse?: No  Histroy of Prescripton Drug Abuse?: No  Medical Problems:History reviewed. No pertinent past medical history.     Risk:  Fall RiskTotal: 53  Hardik Scale Hardik Scale Score: 22  BVC Total: 0  Change in scores no Changes to plan of Care no    Status EXAM:   Status and Exam  Normal: No  Facial Expression: Flat  Affect: Blunt  Level of Consciousness: Alert  Mood:Normal: No  Mood: Depressed, Anxious  Motor Activity:Normal: Yes  Motor Activity: Decreased  Interview Behavior: Cooperative  Preception: [place] to Person, Maria Victoria Graver to Time, [place] to Place, [place] to Situation  Attention:Normal: No  Attention: Distractible  Thought Processes: Circumstantial  Thought Content:Normal: No  Thought Content: Preoccupations  Hallucinations: None  Delusions: No  Memory:Normal: Yes  Insight and Judgment: No  Insight and Judgment: Poor Insight, Poor Judgment  Present Suicidal Ideation: No  Present Homicidal Ideation: No    Daily Assessment Last Entry:   Daily Sleep (WDL): Within Defined Limits         Patient Currently in Pain: Denies  Daily Nutrition (WDL): Within Defined Limits Appetite Change: Decreased  Barriers to Nutrition: None  Level of Assistance: Independent/Self    Patient Monitoring:  Frequency of Checks: 4 times per hour, close    Psychiatric Symptoms:   Depression Symptoms  Depression Symptoms: Impaired concentration  Anxiety Symptoms  Anxiety Symptoms: Generalized  Siria Symptoms  Siria Symptoms: No problems reported or observed. Psychosis Symptoms  Delusion Type: No problems reported or observed. Suicide Risk CSSR-S:  1) Within the past month, have you wished you were dead or wished you could go to sleep and not wake up? : Yes  2) Have you actually had any thoughts of killing yourself? : Yes  3) Have you been thinking about how you might kill yourself? : Yes  5) Have you started to work out or worked out the details of how to kill yourself?  Do you intend to carry out this plan? : Yes  6) Have you ever done anything, started to do anything, or prepared to do anything to end your life?: Yes  Change in Result no Change in Plan of care no      EDUCATION:   EDUCATION:   Learner Progress Toward Treatment Goals: Reviewed results and recommendations of this team, Reviewed group plan and strategies, Reviewed signs, symptoms and risk of self harm and violent behavior, Reviewed goals and plan of care    Method:small group, individual verbal education    Outcome:verbalized by patient, but needs reinforcement to obtain goals    PATIENT GOALS:  Short term: decreasing anxiety  Long term: continue to see therapist for eating disorder    PLAN/TREATMENT RECOMMENDATIONS UPDATE: continue with group therapies, increased socialization, continue planning for after discharge goals, continue with medication compliance    SHORT-TERM GOALS UPDATE:   Time frame for Short-Term Goals: 5-7 days    LONG-TERM GOALS UPDATE:   Time frame for Long-Term Goals: 6 months  Members Present in Team Meeting: See Signature Sheet    AGUILA Alfosno

## 2021-04-13 PROCEDURE — 6370000000 HC RX 637 (ALT 250 FOR IP): Performed by: PSYCHIATRY & NEUROLOGY

## 2021-04-13 PROCEDURE — 1240000000 HC EMOTIONAL WELLNESS R&B

## 2021-04-13 PROCEDURE — 99232 SBSQ HOSP IP/OBS MODERATE 35: CPT | Performed by: PSYCHIATRY & NEUROLOGY

## 2021-04-13 RX ADMIN — HYDROXYZINE HYDROCHLORIDE 50 MG: 50 TABLET, FILM COATED ORAL at 09:36

## 2021-04-13 RX ADMIN — HYDROXYZINE HYDROCHLORIDE 50 MG: 50 TABLET, FILM COATED ORAL at 17:42

## 2021-04-13 RX ADMIN — TRAZODONE HYDROCHLORIDE 50 MG: 50 TABLET ORAL at 21:01

## 2021-04-13 RX ADMIN — CLONAZEPAM 0.5 MG: 0.5 TABLET ORAL at 21:01

## 2021-04-13 RX ADMIN — PAROXETINE HYDROCHLORIDE 25 MG: 12.5 TABLET, FILM COATED, EXTENDED RELEASE ORAL at 09:36

## 2021-04-13 NOTE — PROGRESS NOTES
BEHAVIORAL HEALTH FOLLOW-UP NOTE     4/13/2021    Patient was seen and examined in person, chart reviewed  Patient's case discussed with staff/team    Chief Complaint: Suicidal ideation to drive car off a bridge    Interim History:   Patient maintains medication compliance and has not required any emergency as needed medications. He was friendly on approach and cooperative and engaged in conversation. He described his mood as being \"fair\" today and expressed that his suicidal ideation is \"mostly gone\". He stated that he has not had these thoughts since last Sunday. He is denying homicidal ideation and auditory/visual hallucinations. He has been eating well and consuming>75% of his meals. He feels that he got enough sleep last night and was able to sleep for several hours in a row. He has been attending groups regularly and his interactions with peers and staff have been appropriate. He has been using as needed Atarax for anxiety and as needed trazodone for sleep. He endorses ongoing feelings of some depression and anxiety but overall states he feels like it is getting a little better. He discussed with writer his therapist suggesting he go to a program for an eating disorder. He stated that sometimes he purposefully does not eat enough or he will exercise excessively. This behavior has not been as problematic for him while being at this facility. He also discussed his current status as a student at [employer]. He has plans to stay in the [place] area with friends and to go back to school. He denies any side effects of his medications. He had no further questions or concerns and is able to contract for safety.     /71   Pulse 77   Temp 98.2 °F (36.8 °C) (Oral)   Resp 14   Ht 5' 10\" (1.778 m)   Wt 245 lb (111.1 kg)   SpO2 98%   BMI 35.15 kg/m²   Appetite:  [x] Normal/Unchanged  [] Increased  [] Decreased      Sleep:       [x] Normal/Unchanged  [] Fair       [] Poor              Energy:    [x] Normal/Unchanged  [] Increased  [] Decreased        Aggression:  [] yes  [x] no    Patient is [x] able  [] unable to CONTRACT FOR SAFETY ON THE UNIT    PAST MEDICAL/PSYCHIATRIC HISTORY:   History reviewed. No pertinent past medical history. FAMILY/SOCIAL HISTORY:  History reviewed. No pertinent family history. Social History     Socioeconomic History    Marital status: Single     Spouse name: Not on file    Number of children: Not on file    Years of education: Not on file    Highest education level: Not on file   Occupational History    Not on file   Social Needs    Financial resource strain: Not on file    Food insecurity     Worry: Not on file     Inability: Not on file    Transportation needs     Medical: Not on file     Non-medical: Not on file   Tobacco Use    Smoking status: Never Smoker    Smokeless tobacco: Never Used   Substance and Sexual Activity    Alcohol use: Never     Frequency: Never    Drug use: Never    Sexual activity: Not on file   Lifestyle    Physical activity     Days per week: Not on file     Minutes per session: Not on file    Stress: Not on file   Relationships    Social connections     Talks on phone: Not on file     Gets together: Not on file     Attends Oriental orthodox service: Not on file     Active member of club or organization: Not on file     Attends meetings of clubs or organizations: Not on file     Relationship status: Not on file    Intimate partner violence     Fear of current or ex partner: Not on file     Emotionally abused: Not on file     Physically abused: Not on file     Forced sexual activity: Not on file   Other Topics Concern    Not on file   Social History Narrative    Not on file     ROS:  [x] All negative/unchanged except if checked.  Explain positive(checked items) below:  [] Constitutional  [] Eyes  [] Ear/Nose/Mouth/Throat  [] Respiratory  [] CV  [] GI  []   [] Musculoskeletal  [] Skin/Breast  [] Neurological  [] Endocrine  [] Heme/Lymph  [] Allergic/Immunologic    Explanation:     MEDICATIONS:    Current Facility-Administered Medications:     acetaminophen (TYLENOL) tablet 650 mg, 650 mg, Oral, Q4H PRN, Bebe Baldwin MD    aluminum & magnesium hydroxide-simethicone (MAALOX) 200-200-20 MG/5ML suspension 30 mL, 30 mL, Oral, Q6H PRN, Bebe Baldwin MD    hydrOXYzine (ATARAX) tablet 50 mg, 50 mg, Oral, TID PRN, Bebe Baldwin MD, 50 mg at 04/13/21 0936    ibuprofen (ADVIL;MOTRIN) tablet 400 mg, 400 mg, Oral, Q6H PRN, Bebe Baldwin MD    polyethylene glycol (GLYCOLAX) packet 17 g, 17 g, Oral, Daily PRN, Bebe Baldwin MD    traZODone (DESYREL) tablet 50 mg, 50 mg, Oral, Nightly PRN, Bebe Baldwin MD, 50 mg at 04/12/21 2204    PARoxetine (PAXIL CR) extended release tablet 25 mg, 25 mg, Oral, QAM, Joshua Lizett APRN - CNP, 25 mg at 04/13/21 0936    clonazePAM (KLONOPIN) tablet 0.5 mg, 0.5 mg, Oral, Nightly, Joshua Gohernandez APRN - CNP, 0.5 mg at 04/12/21 2204      Examination:  /71   Pulse 77   Temp 98.2 °F (36.8 °C) (Oral)   Resp 14   Ht 5' 10\" (1.778 m)   Wt 245 lb (111.1 kg)   SpO2 98%   BMI 35.15 kg/m²   Gait - steady  Medication side effects(SE): Denies    Mental Status Examination:    Level of consciousness:  within normal limits   Appearance:  fair grooming and fair hygiene  Behavior/Motor:  no abnormalities noted  Attitude toward examiner:  cooperative, attentive and good eye contact  Speech:  spontaneous, normal rate and normal volume   Mood: depressed  Affect:  mood congruent  Thought processes:  linear, goal directed and coherent   Thought content:  Homicidal ideation - none  Suicidal Ideation:  denies suicidal ideation  Delusions:  no evidence of delusions  Perceptual Disturbance:  denies any perceptual disturbance  Cognition:  oriented to person, place, and time   Concentration intact  Insight fair   Judgement fair     ASSESSMENT:  Patient symptoms are:  [] Well controlled [x] Improving  [] Worsening  [] No change      Diagnosis:   Principal Problem:    MDD (major depressive disorder), recurrent severe, without psychosis ([facility] [address]  Active Problems:    Current severe episode of major depressive disorder without psychotic features without prior episode (HCC)    Elevated blood pressure reading in office without diagnosis of hypertension  Resolved Problems:    * No resolved hospital problems. *      LABS:    Recent Labs     04/11/21 0413   WBC 12.3   HGB 15.0        Recent Labs     04/11/21 0413      K 4.3      CO2 23   BUN 15   CREATININE 0.77   GLUCOSE 117*     Recent Labs     04/11/21 0413   BILITOT 0.60   ALKPHOS 102   AST 31   ALT 42*     No results found for: Corlis Drier, CANNAB, COCAINESCRN, LABMETH, OPIATESCREENURINE, PHENCYCLIDINESCREENURINE, PPXUR, ETOH  No results found for: TSH, FREET4  No results found for: LITHIUM  No results found for: VALPROATE, CBMZ    Treatment Plan:  Reviewed current Medications with the patient. Risks, benefits, side effects, drug-to-drug interactions and alternatives to treatment were discussed. The patient consented to ongoing treatment. Encourage patient to attend group and other milieu activities. Discharge planning discussed with the patient and treatment team.    PSYCHOTHERAPY/COUNSELING:  [] Therapeutic interview  [x] Supportive  [] CBT  [] Ongoing  [] Other    [x] Patient continues to need, on a daily basis, active treatment furnished directly by or requiring the supervision of inpatient psychiatric personnel      Anticipated Length of stay: To be decided    Delbert Castellanos is a 24 y.o. male being evaluated face to face. --KAMRYN Figueroa CNP on 4/13/2021 at 1:22 PM    An electronic signature was used to authenticate this note. **This report has been created using voice recognition software. It may contain minor errors which are inherent in voice recognition technology. **  I independently saw and evaluated the patient. I reviewed the midlevel provider's documentation above. Any additional comments or changes to the midlevel provider's documentation are stated below otherwise agree with assessment. -More talkative. Mood is a little better. Says he is \"managing\" his suicide thoughts a lot better. He is still having them   -Anxiety continues to be a problem and is bringing him down.   -No AVH. PLAN  Medications as noted above  Attempt to develop insight  Psycho-education conducted. Supportive Therapy conducted.   Probable discharge is as noted above  Follow-up daily while on inpatient unit    Electronically signed by Bea Hwang MD on 4/13/21 at 1:42 PM EDT

## 2021-04-13 NOTE — BH NOTE
Charting reviewed by RN.
Charting reviewed by RN.
Dr. Og Fontana notified by perfect serve of internal med consult.
Medications verified by writer at [employer]
Patient attended open rec at [phone]
RN has reviewed LPN documentation.
counseling faxed to [facility]                                           ( ) Patient refused counseling  ( ) Patient has not smoked in the last 30 days    Metabolic Screening:    No results found for: LABA1C    No results found for: CHOL  No results found for: TRIG  No results found for: HDL  No components found for: LDLCAL  No results found for: LABVLDL      Body mass index is 35.15 kg/m². BP Readings from Last 2 Encounters:   04/11/21 (!) 147/79           Pt admitted with followings belongings:  Dentures: None  Vision - Corrective Lenses: Glasses  Hearing Aid: None  Jewelry: None  Body Piercings Removed: N/A  Clothing: Footwear, Pants, Shirt, Socks  Were All Patient Medications Collected?: Not Applicable  Other Valuables: Cell phone, Money (Comment), Wallet, Keys(7 dollars)     Valuables placed in safe in security envelope, number:  [id number]. Patient oriented to surroundings and program expectations and copy of patient rights given. Consents reviewed, signed . Patient verbalized understanding:     Patient education on precautions    25 y/o male admitted from [facility] for suicidal ideation to drive car off bridge. Was brought to ED by friends who became concerned for Pt because he wasn't acting like himself. Pt started becoming depressed due to death off grandpa in March and Grandma in fall. They were his primary support system. Pt states that father just kicked him out so he is staying with friends.  Denies all on admission and contracts for safety.                    Alyson Douglas RN

## 2021-04-13 NOTE — GROUP NOTE
Group Therapy Note    Date: 4/13/2021    Group Start Time: 1430  Group End Time: 1500  Group Topic: Relaxation    [facility]    Cathleen Drake        Group Therapy Note    Attendees: 5/17         Patient's Goal: To increase positive coping skills     Notes:      Status After Intervention:  Improved    Participation Level:  Active Listener and Interactive    Participation Quality: Appropriate and Attentive      Speech:  normal      Thought Process/Content: Logical  Linear      Affective Functioning: Constricted/Restricted      Mood: depressed      Level of consciousness:  Alert, Oriented x4 and Attentive      Response to Learning: Progressing to goal      Endings: None Reported    Modes of Intervention: Education, Support, Socialization, Exploration, Clarifying, Problem-solving and Activity      Discipline Responsible: Psychoeducational Specialist      Signature:  Cathleen Drake

## 2021-04-13 NOTE — PLAN OF CARE
Problem: Altered Mood, Depressive Behavior:  Goal: Able to verbalize and/or display a decrease in depressive symptoms  Description: Able to verbalize and/or display a decrease in depressive symptoms  4/13/2021 1051 by Golden Fonseca  Outcome: Ongoing  Note: Patient is accepting of 1:1 talk time with staff. Patient admits to depression and anxiety. Patient is eating and sleeping well. Patient denies suicidal and homicidal ideation and auditory and visual hallucinations and verbally agrees to approach staff if thoughts of self harm arises. Reassurance and support provided. Patient is medication compliant. Q15 minute checks maintained. Patient remains safe at this time.

## 2021-04-13 NOTE — GROUP NOTE
Group Therapy Note    Date: 4/13/2021    Group Start Time: 1330  Group End Time: 9894  Group Topic: Cognitive Skills    [facility]    TREASURE Hair        Group Therapy Note    Attendees 9       Patient's Goal:  To improve coping skills/ improve communication skills         Status After Intervention:  Improved    Participation Level:  Active Listener and Interactive    Participation Quality: Appropriate, Attentive, Sharing and Supportive      Speech:  normal      Thought Process/Content: Logical      Affective Functioning: flat    Mood:depressed       Level of consciousness:  Alert       Response to Learning: Able to verbalize current knowledge/experience and Progressing to goal      Endings: None Reported    Modes of Intervention: Education, Support and Problem-solving      Discipline Responsible: Psychoeducational Specialist      Signature:  Severino Gilmore

## 2021-04-13 NOTE — GROUP NOTE
Group Therapy Note    Date: 4/13/2021    Group Start Time: 0900  Group End Time: 0915  Group Topic: Community Meeting    TORREY VILLEGAS YOLIS Springer        Group Therapy Note    Attendees: 7/18         Patient's Goal: To increase interpersonal interaction     Notes:      Status After Intervention:  Improved    Participation Level:  Active Listener and Interactive    Participation Quality: Appropriate, Attentive and Sharing      Speech:  normal      Thought Process/Content: Logical  Linear      Affective Functioning: Constricted/Restricted      Mood: anxious      Level of consciousness:  Alert, Oriented x4 and Attentive      Response to Learning: Able to verbalize current knowledge/experience, Able to verbalize/acknowledge new learning, Able to retain information and Progressing to goal      Endings: None Reported    Modes of Intervention: Education, Support, Socialization, Exploration, Clarifying and Problem-solving      Discipline Responsible: Psychoeducational Specialist      Signature:  Speedy Springer

## 2021-04-13 NOTE — PLAN OF CARE
Problem: Altered Mood, Depressive Behavior:  Goal: Able to verbalize and/or display a decrease in depressive symptoms  Description: Able to verbalize and/or display a decrease in depressive symptoms  4/12/2021 2059 by Pablito Marquez RN  Outcome: Ongoing  Note: Patient denies suicidal ideation and verbally contracts for safety. Patient remains safe during Q15 min and random safety checks. Patient remains in hospital appropriate clothing at all times and free from harmful objects. Patient is accepting of talk time with writer. Denies any thoughts to harm others, denies any hallucinations. States he is sleeping okay and eating okay.

## 2021-04-13 NOTE — GROUP NOTE
Group Therapy Note    Date: 4/13/2021    Group Start Time: 1000  Group End Time: 9922  Group Topic: Psychotherapy    [facility]    ENMANUEL Metcalf, [facility]        Group Therapy Note    Attendees: 3/8         Patients Goal: Pt will participate in psychotherapy in order to help eliminate or control troubling symptoms so a person can function better and can increase well-being and healing. Notes: Group was focused on articulating feelings into words and communicating ones emotions. Status After Intervention:  Improved    Participation Level:  Active Listener and Interactive    Participation Quality: Appropriate, Attentive and Sharing      Speech:  normal      Thought Process/Content: Logical      Affective Functioning: Flat      Mood: depressed      Level of consciousness:  Alert, Oriented x4 and Attentive      Response to Learning: Able to verbalize current knowledge/experience and Able to verbalize/acknowledge new learning      Endings: None Reported    Modes of Intervention: Support, Socialization, Exploration, Clarifying and Problem-solving      Discipline Responsible: /Counselor      Signature:  ENMANUEL Metcalf, [place]

## 2021-04-13 NOTE — GROUP NOTE
Group Therapy Note    Date: 4/13/2021    Group Start Time: 1100  Group End Time: 1130  Group Topic: Psychoeducation    [facility]    Mega [place]        Group Therapy Note    Attendees: 3/8         Patient's Goal:  To increase interpersonal interaction     Notes:      Status After Intervention:  Improved    Participation Level:  Active Listener and Interactive    Participation Quality: Appropriate, Attentive and Sharing      Speech:  normal      Thought Process/Content: Logical  Linear      Affective Functioning: Congruent      Mood: euthymic      Level of consciousness:  Alert, Oriented x4 and Attentive      Response to Learning: Able to verbalize current knowledge/experience, Able to verbalize/acknowledge new learning, Able to retain information and Progressing to goal      Endings: None Reported    Modes of Intervention: Education, Support, Socialization, Exploration, Clarifying, Problem-solving and Activity      Discipline Responsible: Psychoeducational Specialist      Signature:  Mega [place]

## 2021-04-14 VITALS
HEIGHT: 70 IN | HEART RATE: 81 BPM | DIASTOLIC BLOOD PRESSURE: 95 MMHG | OXYGEN SATURATION: 98 % | RESPIRATION RATE: 14 BRPM | BODY MASS INDEX: 35.07 KG/M2 | TEMPERATURE: 98.1 F | WEIGHT: 245 LBS | SYSTOLIC BLOOD PRESSURE: 143 MMHG

## 2021-04-14 PROCEDURE — 99239 HOSP IP/OBS DSCHRG MGMT >30: CPT | Performed by: PSYCHIATRY & NEUROLOGY

## 2021-04-14 PROCEDURE — 6370000000 HC RX 637 (ALT 250 FOR IP): Performed by: PSYCHIATRY & NEUROLOGY

## 2021-04-14 RX ORDER — PAROXETINE HYDROCHLORIDE 25 MG/1
25 TABLET, FILM COATED, EXTENDED RELEASE ORAL EVERY MORNING
Qty: 30 TABLET | Refills: 3 | Status: SHIPPED | OUTPATIENT
Start: 2021-04-15

## 2021-04-14 RX ADMIN — PAROXETINE HYDROCHLORIDE 25 MG: 12.5 TABLET, FILM COATED, EXTENDED RELEASE ORAL at 09:30

## 2021-04-14 RX ADMIN — HYDROXYZINE HYDROCHLORIDE 50 MG: 50 TABLET, FILM COATED ORAL at 09:31

## 2021-04-14 NOTE — DISCHARGE INSTR - DIET

## 2021-04-14 NOTE — GROUP NOTE
Group Therapy Note    Date: 4/14/2021    Group Start Time: 1100  Group End Time: 5685  Group Topic: cognitive skills    TREASURE Saunders        Group Therapy Note    Attendees 4/9         Patient's Goal:  To improve coping skills     Notes:   Pt was pleasant and participated well     Status After Intervention:  Improved    Participation Level:  Active Listener and Interactive    Participation Quality: Appropriate, Attentive, Sharing and Supportive      Speech:  normal      Thought Process/Content: Logical      Affective Functioning: Congruent      Mood: euthymic      Level of consciousness:  Alert and Oriented x4      Response to Learning: Able to verbalize current knowledge/experience, Able to verbalize/acknowledge new learning and Progressing to goal      Endings: None Reported    Modes of Intervention: Education, Support, Socialization and Problem-solving      Discipline Responsible: Psychoeducational Specialist      Signature:  Jackie Aguirre

## 2021-04-14 NOTE — SUICIDE SAFETY PLAN
SAFETY PLAN    A suicide Safety Plan is a document that supports someone when they are having thoughts of suicide. Warning Signs that indicate a suicidal crisis may be developing: What (situations, thoughts, feelings, body sensations, behaviors, etc.) do you experience that lets you know you are beginning to think about suicide? 1. Go off medications  2. Mood is depressed and start to feel sad, hopeless, helpless, guilty, decline in self-esteem, excess worry, no interest in doing any pleasurable activities, unable to concentrate  3. Begin to cry over the smallest of things  4. Not eating or sleeping as normal  5. Relationship issues start happening  6. I become angry and start a fight  7. When I dont listen or respond to people in a good, positive way  Internal Coping Strategies:  What things can I do (relaxation techniques, hobbies, physical activities, etc.) to take my mind off my problems without contacting another person? 1. Go to hospital discharge appointments and follow-up with community mental health counseling  2. Talk with other people  3. Learn to identify and control your emotions by new ways  4. Think before you speak or act; walk away from the situation  5. Join a support group in person or on Social Media  6. Take a time-out  7. Take deep breaths; use relaxation techniques  8. Get some exercise; go for a walk  9. Read; listen to music; watch a funny movie   People whom I can ask for help: Who can I call when I need help - for example, friends, family, clergy, someone else? Dr. Thomason Dear   [address] [place] [address]  [phone]  [address] I can contact during a crisis: Who can I call for help - for example, my doctor, my psychiatrist, my psychologist, a mental health provider, a suicide hotline?   Dr. Thomason Dear   [address] [place] [address]  [phone]  Suicide Prevention Lifeline: 7-736-127-TALK (5936)  [address] 2-1-18 (401) 906-3989 or (591) 500-7206  Rescue [address] [facility], 24-hour Crisis Services, Central Access: (782) 974-4109, [address] [facility], 12 Ju[address] KWON (National Association of Mental Illness) groups and support, [address] (580) 314-4557  4. [address] [place] [address] Emergency Services -  for example, Community Mental            [facility] 141, 97 [facility] Board [facility], Crisis line: [address] [facility] Crisis Response Team (Crisis Intervention Team - [place]), [phone] or 9-1-1  [address] [facility], [address] Association of Mental Illness, 1-663-488-796.506.3725  [facility] 151 [facility], 5-557-057-HELP (1204)   Crisis Text Line, Text 4HOPE to 326355 to connect with a crisis counselor  [address] [facility], [phone]  [address] ([place]), [phone]  [facility], [address] [facility] 124  [address] [facility], [address] [facility] 22 [phone]  Shena Rosario, [address] [place] [address]., [place], [address] [facility], 6 [facility]., [place], [address] [facility] [address], [phone]    Making the environment safe: How can I make my environment (house/apartment/living space) safer? For example, can I remove guns, medications, and other items? 1. Throw away all medications not being taken  2.  Plan daily goals to help remember to stay on specific medications

## 2021-04-14 NOTE — GROUP NOTE
Group Therapy Note    Date: 4/14/2021    Group Start Time: 1000  Group End Time: 3554  Group Topic: Psychotherapy    [facility] PRICE Taylor    Group Therapy Note     Attendees: 5/9      Patient's Goal:  Increase interpersonal relationship skills     Notes:  Patient was an active participant in group discussion      Status After Intervention:  Unchanged     Participation Level:  Active Listener and Interactive     Participation Quality: Appropriate, Attentive, Sharing and Supportive      Speech:  normal      Thought Process/Content: Logical     Affective Functioning: Congruent     Mood: anxious, depressed, dysphoric and elevated      Level of consciousness:  Alert      Response to Learning: Able to verbalize current knowledge/experience      Endings: None Reported     Modes of Intervention: Support, Socialization, Exploration and Clarifying    Discipline Responsible: /Counselor      Signature:  Cassandra Campbell MSW, LSW

## 2021-04-14 NOTE — PLAN OF CARE
Problem: Altered Mood, Depressive Behavior:  Goal: Able to verbalize and/or display a decrease in depressive symptoms  Description: Able to verbalize and/or display a decrease in depressive symptoms  4/13/2021 2009 by Lynette Gutiérrez RN  Outcome: Ongoing   Patient states they are not having thoughts of self harm at this time and verbally agrees to seek staff if feelings of harming self arise. Patient behavior controlled and accepting of medications, flat,denies audio hallucination and visual hallucinations patient eating and sleeping well. Patient reports depression and  6/10 related to family loss. Staff will continue to monitor for safety and offer support as needed.
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
## 2021-04-14 NOTE — GROUP NOTE
Group Therapy Note    Date: 4/14/2021    Group Start Time: 0900  Group End Time: [id number]  Group Topic: Community Meeting    TREASURE [place]        Group Therapy Note    Attendees: 7         Patient's Goal:  To improve goal setting skills/ improve decision making skills     Notes:   Pt was pleasant and participated well     Status After Intervention:  Improved    Participation Level:  Active Listener and Interactive    Participation Quality: Appropriate, Attentive, Sharing and Supportive      Speech:  normal      Thought Process/Content: Logical      Affective Functioning: flat but brightens at times      Mood depressed       Level of consciousness:  Alert and Oriented x4      Response to Learning: Able to verbalize current knowledge/experience, Able to verbalize/acknowledge new learning and Progressing to goal      Endings: None Reported    Modes of Intervention: Education, Support, Socialization and Problem-solving      Discipline Responsible: Psychoeducational Specialist      Signature:  Albesa Paget

## 2024-09-25 ENCOUNTER — OFFICE VISIT (OUTPATIENT)
Dept: URGENT CARE | Age: 24
End: 2024-09-25
Payer: COMMERCIAL

## 2024-09-25 VITALS
DIASTOLIC BLOOD PRESSURE: 93 MMHG | SYSTOLIC BLOOD PRESSURE: 139 MMHG | BODY MASS INDEX: 38.19 KG/M2 | TEMPERATURE: 99 F | OXYGEN SATURATION: 98 % | WEIGHT: 252 LBS | HEIGHT: 68 IN | HEART RATE: 83 BPM | RESPIRATION RATE: 20 BRPM

## 2024-09-25 DIAGNOSIS — M77.8 RIGHT ELBOW TENDINITIS: Primary | ICD-10-CM

## 2024-09-25 RX ORDER — PREDNISONE 50 MG/1
50 TABLET ORAL DAILY
Qty: 5 TABLET | Refills: 0 | Status: SHIPPED | OUTPATIENT
Start: 2024-09-25 | End: 2024-09-30

## 2024-09-25 RX ORDER — BUSPIRONE HYDROCHLORIDE 15 MG/1
1 TABLET ORAL
COMMUNITY
Start: 2024-08-30

## 2024-09-25 RX ORDER — DEXTROAMPHETAMINE SACCHARATE, AMPHETAMINE ASPARTATE, DEXTROAMPHETAMINE SULFATE AND AMPHETAMINE SULFATE 2.5; 2.5; 2.5; 2.5 MG/1; MG/1; MG/1; MG/1
TABLET ORAL
COMMUNITY
Start: 2024-09-20

## 2024-09-25 RX ORDER — BENZONATATE 100 MG/1
100 CAPSULE ORAL EVERY 8 HOURS
COMMUNITY

## 2024-09-25 RX ORDER — BUPROPION HYDROCHLORIDE 300 MG/1
1 TABLET ORAL
COMMUNITY
Start: 2024-09-05

## 2024-09-25 NOTE — PROGRESS NOTES
Chief Complaint   Patient presents with    Arm Pain     Pt has pain in his rt arm for about 1x wk       Physical Exam Right elbow:     Skin: no ecchymosis erythema  Swelling: none  Deformity: None  Tenderness: extensor tendons  ROM: pain with extension and supination  Joint effusion: None          Encounter Diagnosis   Name Primary?    Right elbow tendinitis Yes          Plan:   Prednisone     OTC Tylenol  Heat   FU sports med